# Patient Record
Sex: FEMALE | Race: OTHER | Employment: UNEMPLOYED | URBAN - METROPOLITAN AREA
[De-identification: names, ages, dates, MRNs, and addresses within clinical notes are randomized per-mention and may not be internally consistent; named-entity substitution may affect disease eponyms.]

---

## 2024-05-04 ENCOUNTER — HOSPITAL ENCOUNTER (OUTPATIENT)
Dept: ULTRASOUND IMAGING | Age: 29
Discharge: HOME | End: 2024-05-04
Attending: INTERNAL MEDICINE
Payer: COMMERCIAL

## 2024-05-04 ENCOUNTER — HOSPITAL ENCOUNTER (OUTPATIENT)
Dept: GENERAL RADIOLOGY | Age: 29
Discharge: HOME | End: 2024-05-04
Attending: INTERNAL MEDICINE
Payer: COMMERCIAL

## 2024-05-04 DIAGNOSIS — N92.6 MENSTRUAL PERIODS IRREGULAR: ICD-10-CM

## 2024-05-04 DIAGNOSIS — M79.672 PAIN OF LEFT HEEL: ICD-10-CM

## 2024-05-04 PROCEDURE — 76830 TRANSVAGINAL US NON-OB: CPT | Performed by: INTERNAL MEDICINE

## 2024-05-04 PROCEDURE — 76856 US EXAM PELVIC COMPLETE: CPT | Performed by: INTERNAL MEDICINE

## 2024-05-04 PROCEDURE — 73650 X-RAY EXAM OF HEEL: CPT | Performed by: INTERNAL MEDICINE

## 2024-05-04 PROCEDURE — 93975 VASCULAR STUDY: CPT | Performed by: INTERNAL MEDICINE

## 2024-08-22 ENCOUNTER — OFFICE VISIT (OUTPATIENT)
Dept: OBGYN CLINIC | Facility: CLINIC | Age: 29
End: 2024-08-22

## 2024-08-22 VITALS
HEIGHT: 63.78 IN | WEIGHT: 186.31 LBS | DIASTOLIC BLOOD PRESSURE: 63 MMHG | BODY MASS INDEX: 32.2 KG/M2 | HEART RATE: 65 BPM | SYSTOLIC BLOOD PRESSURE: 115 MMHG

## 2024-08-22 DIAGNOSIS — Z23 NEED FOR HPV VACCINATION: ICD-10-CM

## 2024-08-22 DIAGNOSIS — N92.6 IRREGULAR MENSES: Primary | ICD-10-CM

## 2024-08-22 NOTE — PATIENT INSTRUCTIONS
Dietary changes and exercise and weight loss are cornerstones of management of PCOS / diabetes / obesity /  weight loss      Check EPA list of 'Clean 15' and 'dirty dozen' while deciding to buy organic   Organic foods have less pesticides and chemical contaminants      - Diet changes :   Best evidence is for whole food plant based/mediterraean diet for heart health/diabetes (based on Ornish diet, [place] study, PURE study, [place])  Small portion sized meals.  Low carb - 30-40 gm with each meal.  NO Sugar, Soda, Juices and Sweets.  --- Fill half of your plate with low carb veggies and greens as discussed.  -- eat low sugar fruits apple , pears and berries : blueberries, strawberries, raspberries etc    -- avoid tropical fruits like pineapple,alejandro , grapes , watermelon, papaya   -- about 25% fruits and 75% veggies  to reduce sugar intake)  More freshly prepared meals than frozen,   More plant based foods than processed meats.  Incorporate good fats - Eg :Extra virgin olive oil, Nuts, Avocado.  Provided information handouts on carb counting and obesity.     Consider ''Time restricted eating / feeding '' also called ''intermittent fasting'': (Resource: \"The Complete Guide to Fasting\" by Dr Quentin Aviles)  Eating all your small healthy meals in a narrow window 6-8 hour and only drinking water or non sugar or non carb liquids ( black tea, black coffee or green tea) rest of the time with a minimum overnight fasting periods of 12 hours     consider starting B complex 1 tablet daily      Exercise :   Brisk walk 30-45 min everyday or a total of 150 min per week    Weight loss target :   10 % with 1/2-1 lb per week over 6 to 12 months      Follow up 3 months

## 2024-08-22 NOTE — PROGRESS NOTES
[facility]  Obstetrics and Gynecology  Gynecology Visit    Chief Complaint   Patient presents with    Consult           Chichi Christiansen is a 29 year old female who presents for irregular menstrual .    LMP: 24 .    Menses regular: irregular menstrual .    Menstrual flow normal: heavy to light .    Birth control or HRT:  none .   Refill n/a   Last Pap Smear: last pap per pt 2024 .  Any history of abnormal paps: per pt normal results    Last MMG: under age     Any Medication Refills needed today? N/a   Sleep: 8 hour.    Diet: normal .    Exercise: 2 days out of the week.   Screening labs/Blood work today: none .     Colonoscopy (if over 46 y/o): underage .   Gardasil:(age 9-46 y/o) unknown .   Genetic Cancer screen (if indicated):   n/a .   Flu (Aug-April): none .TDAP (every 10 years) none .      Additional Problems/concerns:  menstruals last 1-2 months every 6-12 months .      Next Appt: PT WOULD LIKE TO WAIT       There is no immunization history on file for this patient.    No current outpatient medications on file.    No Known Allergies    OB History    Para Term  AB Living   0 0 0 0 0 0   SAB IAB Ectopic Multiple Live Births   0 0 0 0 0       History reviewed. No pertinent past medical history.    History reviewed. No pertinent surgical history.    History reviewed. No pertinent family history.     Tobacco  Meds  Med Hx  Surg Hx  Fam Hx  Soc Hx        Social History     Socioeconomic History    Marital status:      Spouse name: Not on file    Number of children: Not on file    Years of education: Not on file    Highest education level: Not on file   Occupational History    Not on file   Tobacco Use    Smoking status: Never     Passive exposure: Never    Smokeless tobacco: Never   Vaping Use    Vaping status: Never Used   Substance and Sexual Activity    Alcohol use: Yes    Drug use: Never    Sexual activity: Not on file   Other Topics Concern    Not on file   Social  History Narrative    Not on file     Social Determinants of Health     Financial Resource Strain: Not on file   Food Insecurity: Not on file   Transportation Needs: Not on file   Physical Activity: Not on file   Stress: Not on file   Social Connections: Not on file   Housing Stability: Not on file        (Noni - ID [id number] )    /63 (BP Location: Right arm, Patient Position: Sitting, Cuff Size: adult)   Pulse 65   Ht 5' 3.78\" (1.62 m)   Wt 186 lb 5 oz (84.5 kg)   LMP 05/23/2024   Wt Readings from Last 3 Encounters:   08/22/24 186 lb 5 oz (84.5 kg)     Health Maintenance   Topic Date Due    Annual Physical  Never done    DTaP,Tdap,and Td Vaccines (1 - Tdap) Never done    Pap Smear  Never done    COVID-19 Vaccine (1 - 2023-24 season) Never done    Influenza Vaccine (1) 10/01/2024    Annual Depression Screening  Completed    Pneumococcal Vaccine: Birth to 64yrs  Aged Out         Review of Systems   General: Present- Feeling well.  Cardiovascular: Not Present- Chest Pain, Elevated Blood Pressure, Leg Pain and/or Swelling and Shortness of Breath.  Gastrointestinal: Not Present- Nausea and Vomiting.  Female Genitourinary: Not Present- Discharge, Dysmenorrhea, Dysuria, Excessive Menstrual Bleeding and Pelvic Pain.  Musculoskeletal: Not Present- Leg Cramps and Swelling of Extremities.  Neurological: Not Present- Headaches.  Psychiatric: Not Present- Anxiety and Depression.  All other systems negative       Physical Exam   The physical exam findings are as follows:       General   Mental Status - Alert. General Appearance - Well Developed/Well Nourished/No acute distress/ NC/AT.      Note: More than 50% of this visit was spent in counseling or coordinating care for the following reason irregular menses and workup        Dietary changes and exercise and weight loss are cornerstones of management of PCOS / diabetes / obesity /  weight loss      Check EPA list of 'Clean 15' and 'dirty dozen' while deciding  to buy organic   Organic foods have less pesticides and chemical contaminants      - Diet changes :   Best evidence is for whole food plant based/mediterraean diet for heart health/diabetes (based on Ornish diet, [place] study, PURE study, [place])  Small portion sized meals.  Low carb - 30-40 gm with each meal.  NO Sugar, Soda, Juices and Sweets.  --- Fill half of your plate with low carb veggies and greens as discussed.  -- eat low sugar fruits apple , pears and berries : blueberries, strawberries, raspberries etc    -- avoid tropical fruits like pineapple,alejandro , grapes , watermelon, papaya   -- about 25% fruits and 75% veggies  to reduce sugar intake)  More freshly prepared meals than frozen,   More plant based foods than processed meats.  Incorporate good fats - Eg :Extra virgin olive oil, Nuts, Avocado.  Provided information handouts on carb counting and obesity.     Consider ''Time restricted eating / feeding '' also called ''intermittent fasting'': (Resource: \"The Complete Guide to Fasting\" by Dr Quentin Aviles)  Eating all your small healthy meals in a narrow window 6-8 hour and only drinking water or non sugar or non carb liquids ( black tea, black coffee or green tea) rest of the time with a minimum overnight fasting periods of 12 hours     consider starting B complex 1 tablet daily      Exercise :   Brisk walk 30-45 min everyday or a total of 150 min per week    Weight loss target :   10 % with 1/2-1 lb per week over 6 to 12 months      Follow up 3 months      Discussed risks of BC  including risk of blood clots in legs, heart causing heart attack or brain causing a stroke; do not smoke while taking BC as increases risk of clots, nausea and mood disturbances;  (pt denies any family hx of blood clots); caution if any other meds started while she is on BC especially if for contraception as can interfere with efficacy of BC (especially antibiotics) and can increase risk of pregnancy; may elevate BP and would  have to stop if develops HTN; may elevate triglyceride levels/cholesterol; stop if causes severe headaches/migraines, visual changes or jaundice.  May cause nausea, vomiting, spotting or breakthru bleeding; leg/ankle swelling, dark pigment rash on face, breast tenderness, intolerance to contact lenses and gallstones. Also discussed not safe against pregnancy until second pill pack started and how to double up pill if misses a day and how to initiate first pack.     We discussed the risks and side effects associated with birth control including blood clots, nausea, mood disturbances. We talked about the importance of taking the BC at around the same time each day. We discussed what the patient should do if she misses pills. 30% of women will spot in the first month after starting and BC and 10% will in month 3. Pt to notify me if still spotting after 3 months. Patient is aware that if she has significant mood disturbances with this pill that she should notify me and we can change. Pt knows that the pill does not protect her from pregnancy in 100% and it does not protect her against STI. Pt informed that she will need to use other protection in the first month of starting the pill. She is a non-smoker, no FH of  clotting     Pt expressed understanding re: risks and benefits and would like to start med. No barriers to understanding identified.    RTO 3 months to review medication.           Last pap in March wnls per patient and no history of abnormal         1. Irregular menses    2. Need for HPV vaccination

## 2024-10-29 ENCOUNTER — TELEPHONE (OUTPATIENT)
Dept: OBGYN CLINIC | Facility: CLINIC | Age: 29
End: 2024-10-29

## 2024-10-29 NOTE — TELEPHONE ENCOUNTER
I called pt regarding missed appt for 2nd HPV vaccine, unable to reach pt. Pt needs to be rescheduled.

## 2025-01-29 ENCOUNTER — TELEPHONE (OUTPATIENT)
Dept: OBGYN CLINIC | Facility: CLINIC | Age: 30
End: 2025-01-29

## 2025-01-29 NOTE — TELEPHONE ENCOUNTER
Left message regarding pending lab blood work from Dr. Mata. If pt wishes to not complete please cancel pending orders.

## 2025-01-30 NOTE — TELEPHONE ENCOUNTER
Patient verified name and     Patient informed of outstanding labs, states she got a new insurance. Will contact the office to schedule annual and have new orders placed if needed. Orders cancelled.

## (undated) NOTE — LETTER
VACCINE ADMINISTRATION RECORD  PARENT / GUARDIAN APPROVAL  Date: 2024  Vaccine administered to: Chichi Damon Kuldip     : 1995    MRN: [id number]    A copy of the appropriate Centers for Disease Control and Prevention Vaccine Information statement has been provided. I have read or have had explained the information about the diseases and the vaccines listed below. There was an opportunity to ask questions and any questions were answered satisfactorily. I believe that I understand the benefits and risks of the vaccine cited and ask that the vaccine(s) listed below be given to me or to the person named above (for whom I am authorized to make this request).    VACCINES ADMINISTERED:  Gardasil    I have read and hereby agree to be bound by the terms of this agreement as stated above. My signature is valid until revoked by me in writing.  This document is signed by Chichi Mac , relationship: Self on 2024.:                                                                                                                                         Parent / Guardian Signature                                                Date